# Patient Record
Sex: MALE | Race: WHITE | Employment: FULL TIME | URBAN - METROPOLITAN AREA
[De-identification: names, ages, dates, MRNs, and addresses within clinical notes are randomized per-mention and may not be internally consistent; named-entity substitution may affect disease eponyms.]

---

## 2020-11-30 NOTE — PROGRESS NOTES
2020    Luz Gaytan (:  1970) is a 44 y.o. male, here for evaluation of the following medical concerns:    Chief Complaint   Patient presents with   Jose Lundberg Doctor       HPI  40-year-old male new to the [address] [place] [address] [facility] with no encounters available in care everywhere, who comes in to establish care, update health maintenance, and discuss the following concerns:  1. chronic cough. 2.  Occasional clear and yellow rhinorrhea. He has a dry largely nonproductive cough for the past 5 to 10 years after he outgrew childhood allergic asthma that seemed to be mild, inasmuch as he was never hospitalized, does not recall ever receiving prednisone, and only used a inhaler sparingly. Did receive allergy desensitization shots (pollens, ragweed) he does not smoke. Denies reflux symptoms, solid food dysphagia. He has 2 cats at home, his symptoms are worse when he is at home versus at work. No significant occupational exposures, works as an . 2 or 3 months a year, his cough does intensify, but no fevers wheeze or purulent productive cough. Does not track with any particular season. Currently he uses over-the-counter Zyrtec 3 days a month. 3.  Possible preventative measures to reduce risk of Covid. He tested negative for Covid a week ago. He wonders what he can do to reduce risk of Covid. 4.  Self-reported deviated septum from teenage nose break, occasional whistling. He researched possible surgeries to correct whistling, does not enthusiastic about such. He wonders about other potential measures. 5.  Need for colon cancer screening. He has never had a colonoscopy. No  family history of adenomatous colon polyps or colon cancer. He has never undergone surgery. No family history of malignant hyperthermia. He does not have dentures. He received his tetanus booster 3 years ago. He has excellent functional capacity, able to hike 4 miles every weekend with his family. He has no known cardiopulmonary disease currently. He has no known drug allergies. He is not an easy bleeder. He has not been on prednisone chronically. He has no known history of diabetes. There is no personal or family history of venous thromboembolism. There is no family history of malignant hyperthermia. He has no known history of glaucoma, tunnel vision. He has never received a blood transfusion. He has no history of hepatitis. The patient denies recent fevers, shaking chills, drenching sweats. The patient denies new headache, ear or sinus pressure/pain/drainage, sore throat, dental thermal sensitivity, productive cough, abdominal pain, diarrhea, dysuria, new dyspnea, new chest pain, new pleuritic chest pain. The patient also denies new leg swelling, joint warmth/redness, and open skin sores. Review of Systems   Constitutional: Negative for activity change, appetite change, fatigue and unexpected weight change. HENT: Negative for dental problem, sinus pain, sore throat and trouble swallowing. Eyes: Negative for pain and visual disturbance. Respiratory: Negative for apnea, cough, chest tightness, shortness of breath and wheezing. Cardiovascular: Negative for chest pain and palpitations. Gastrointestinal: Negative for abdominal pain, blood in stool, constipation, diarrhea, nausea, rectal pain and vomiting. Endocrine: Negative for cold intolerance, heat intolerance, polydipsia, polyphagia and polyuria. Genitourinary: Negative for difficulty urinating, dysuria, flank pain, frequency, hematuria, pelvic pain, urgency. Musculoskeletal: Negative for arthralgias, back pain, gait problem, joint swelling, myalgias, neck pain and neck stiffness. Skin: Negative for color change and rash. Neurological: Negative for dizziness, tremors, syncope, speech difficulty, weakness, light-headedness and headaches. Hematological: Negative for adenopathy. Does not bruise/bleed easily. Psychiatric/Behavioral: Negative for agitation, behavioral problems, decreased concentration, sleep disturbance and suicidal ideas. The patient is not nervous/anxious and is not hyperactive. Prior to Visit Medications    Medication Sig Taking? Authorizing Provider   Multiple Vitamins-Minerals (MULTIVITAMIN) tablet Take 1 tablet by mouth daily Yes Historical Provider, MD   carbamide peroxide (DEBROX) 6.5 % otic solution Place 5 drops in ear(s) 2 times daily For first 3 days of each month Yes Diana Gonzales MD   fluticasone (FLONASE) 50 MCG/ACT nasal spray 1 spray by Each Nostril route daily Yes Diana Gonzales MD   fexofenadine-pseudoephedrine (ALLEGRA-D 24HR) 180-240 MG per extended release tablet Take 1 tablet by mouth daily Yes Diana Gonzales MD        No Known Allergies    Past Medical History:   Diagnosis Date    Allergic rhinitis     Asthma     as a child       Past Surgical History:   Procedure Laterality Date    WISDOM TOOTH EXTRACTION         Social History     Socioeconomic History    Marital status:      Spouse name: Not on file    Number of children: Not on file    Years of education: Not on file    Highest education level: Not on file   Occupational History    Not on file   Social Needs    Financial resource strain: Not on file    Food insecurity     Worry: Not on file     Inability: Not on file    Transportation needs     Medical: Not on file     Non-medical: Not on file   Tobacco Use    Smoking status: Never Smoker    Smokeless tobacco: Never Used   Substance and Sexual Activity    Alcohol use:  Yes     Alcohol/week: 10.0 standard drinks     Types: 10 Cans of beer per week    Drug use: Never    Sexual activity: Yes     Partners: Female   Lifestyle    Physical activity     Days per week: Not on file     Minutes per session: Not on file    Stress: Not on file   Relationships    Social connections     Talks on phone: Not on file     Gets together: Not on file Attends Protestant service: Not on file     Active member of club or organization: Not on file     Attends meetings of clubs or organizations: Not on file     Relationship status: Not on file    Intimate partner violence     Fear of current or ex partner: Not on file     Emotionally abused: Not on file     Physically abused: Not on file     Forced sexual activity: Not on file   Other Topics Concern    Not on file   Social History Narrative    Not on file        History reviewed. No pertinent family history. Vitals:    12/02/20 0829   BP: 127/83   Pulse: 68   Resp: 18   Temp: 97.9 °F (36.6 °C)   TempSrc: Temporal   SpO2: 99%   Weight: 179 lb 12.8 oz (81.6 kg)   Height: 6' 4\" (1.93 m)     Estimated body mass index is 21.89 kg/m² as calculated from the following:    Height as of this encounter: 6' 4\" (1.93 m). Weight as of this encounter: 179 lb 12.8 oz (81.6 kg). PHYSICAL EXAM  GENERAL:  Pleasant  male who looks his stated age, awake alert and oriented x3, no acute distress. HEENT:  Normocephalic atraumatic. Pupils equal round reactive light and accommodation, extra ocular muscles are intact. Oropharynx is clear moist without injection or exudate. Tongue and palate move normally. Turbinates appear minimally erythematous, currently clear rhinorrhea with deviated septum, no breaks in the visualized portion. Tympanic membranes appear normal.  Nearly occlusive earwax removed bilaterally. Mallampati 2. NECK:  Supple nontender. No carotid bruits. Brisk carotid upstrokes, no JVD. No thyromegaly. He is able to extend his neck to 70 degrees above the horizontal plane. LYMPH:  No supraclavicular cervical lymphadenopathy. LUNGS:  Clear to auscultation bilaterally. Excellent air entry. No inspiratory crackles or expiratory wheezes. HEART:  Regular rate and rhythm without pathologic murmur rub gallop S3 or S4.  PSYCH:  No psychomotor retardation or agitation. Good eye contact.   Unrestricted affect range. Mood congruent with affect. Linear thought. LABS  Lab Review   No results found for any previous visit. ASSESSMENT/PLAN  1. Colon cancer screening  Patient has no signs of active infection at the current time no evidence of decompensated cardiopulmonary disease. Is optimized for colonoscopy, I have asked him to cut down on fiber a few days prior to prepping his colon; to avoid aspirin and NSAIDs for 5 days before through 2 days post scope, and of course clear liquid diet day before colonoscopy and fasting for colonoscopy. We will confirm no surprising electrolyte disturbance prior to prep. - Basic Metabolic Panel; Future  - AFL - Jones Butt MD, Gastroenterology, [facility]    2. Healthcare maintenance  After discussing risks benefits, patient declines prostate cancer screening by PSA. He will inquire regarding insurance coverage for Shingrix at his pharmacy. He received influenza vaccine last week at [facility]; and Tdap in 2017 at [place]. He requires tetanus vaccination in order to support Boy  activities. He denies IV drug abuse, history of sex with men, sex with high risk partners and therefore is unlikely to benefit from HIV screen. We discussed preventative measures involving ventilation handwashing social distancing gloving. Vaccines coming soon.    - TSH WITH REFLEX TO FT4; Future  - Glucose, Fasting; Future  - Lipid Panel; Future  - ALT; Future  - Vitamin D 25 Hydroxy; Future  - HEPATITIS C ANTIBODY; Future    3. Chronic cough  History exam supports perennial rhinitis, his cough is worse in the morning when he first wakes up. Not worse with cold exposure or exercise, against cough variant asthma. I have asked him to inquire with his bed partner regarding witnessed sleep apnea. He has never had any surgery on his sinuses, has never had any imaging of the sinuses. Exam does not support antibiotics at this time.   Exam does not support empiric acid suppressive therapy. Instead we will exclude a lower respiratory source, by chest x-ray, however unlikely. We will initiate trial of Neti pot, Flonase, scheduled Allegra-D and assess response at follow-up. Hopefully the whistle in his nose may decrease as we increase the caliber of the nasal orifice. Should he fail to respond, sinus CT, referral to ENT for nasolaryngoscopy could be considered. Allergy, pulmonology less likely to be beneficial.    Exclude thyroid disease, untreated diabetes as cough source. - TSH WITH REFLEX TO FT4; Future  - Glucose, Fasting; Future  - XR CHEST STANDARD (2 VW); Future    4. Rhinorrhea  See #3 above. 5. Excessive ear wax, bilateral  Removed without incident. Prescription for prophylactic Debrox sent. Discussed prevention. Return in about 3 weeks (around 12/23/2020) for physical report review. It was a pleasure to visit with Mr. Selina Elizabeth today. Answered all questions as best I could. Yousif Soria MD   Time 45 minutes, more than half spent in care coordination and counseling regarding the health maintenance issues identified above.

## 2020-12-02 ENCOUNTER — OFFICE VISIT (OUTPATIENT)
Dept: PRIMARY CARE CLINIC | Age: 50
End: 2020-12-02
Payer: COMMERCIAL

## 2020-12-02 VITALS
SYSTOLIC BLOOD PRESSURE: 127 MMHG | HEIGHT: 76 IN | BODY MASS INDEX: 21.9 KG/M2 | RESPIRATION RATE: 18 BRPM | OXYGEN SATURATION: 99 % | WEIGHT: 179.8 LBS | TEMPERATURE: 97.9 F | HEART RATE: 68 BPM | DIASTOLIC BLOOD PRESSURE: 83 MMHG

## 2020-12-02 PROCEDURE — 99204 OFFICE O/P NEW MOD 45 MIN: CPT | Performed by: INTERNAL MEDICINE

## 2020-12-02 RX ORDER — MULTIVITAMIN WITH FOLIC ACID 400 MCG
1 TABLET ORAL DAILY
COMMUNITY

## 2020-12-02 RX ORDER — FLUTICASONE PROPIONATE 50 MCG
1 SPRAY, SUSPENSION (ML) NASAL DAILY
Qty: 1 BOTTLE | Refills: 2 | Status: SHIPPED | OUTPATIENT
Start: 2020-12-02

## 2020-12-02 RX ORDER — FEXOFENADINE HCL AND PSEUDOEPHEDRINE HCI 180; 240 MG/1; MG/1
1 TABLET, EXTENDED RELEASE ORAL DAILY
Qty: 90 TABLET | Refills: 1 | Status: SHIPPED | OUTPATIENT
Start: 2020-12-02

## 2020-12-02 ASSESSMENT — PATIENT HEALTH QUESTIONNAIRE - PHQ9
2. FEELING DOWN, DEPRESSED OR HOPELESS: 0
SUM OF ALL RESPONSES TO PHQ QUESTIONS 1-9: 0
SUM OF ALL RESPONSES TO PHQ QUESTIONS 1-9: 0
SUM OF ALL RESPONSES TO PHQ9 QUESTIONS 1 & 2: 0
1. LITTLE INTEREST OR PLEASURE IN DOING THINGS: 0
SUM OF ALL RESPONSES TO PHQ QUESTIONS 1-9: 0

## 2020-12-02 NOTE — PATIENT INSTRUCTIONS
1. Fasting blood test ordered  2. Will see you back after tests for complete physical  3. Neti pot irrigate sinuses every couple of days, perhaps at bedtime  4. Debrox rx sent  5. Flonase OTC nasal spray daily, Allegra D daily (until see me back)  6. Ask spouse if you snore/stop breathing while snoring  7. Shingrix vaccination series, check OOP $ with local pharmacy  8.  May pursue sinus imaging, ENT referral depending on response, interest

## 2020-12-02 NOTE — LETTER
[facility]  [address] [id number]  Phone: [phone]  Fax: [phone]    Priyanka Aguiar MD                                                                                   December 2, 2020                       [phone]      Dear Brenda Qureshi: Thank you for enrolling in [address] Please follow the instructions below to securely access your online medical record. [facility] allows you to send messages to your doctor, view your test results, renew your prescriptions, schedule appointments, and more. How Do I Sign Up? 1. In your Internet browser, go to [email or web address]  2. Click on the Sign Up Now link in the Sign In box. You will see the New Member Sign Up page. 3. Enter your [employer] Access Code exactly as it appears below. You will not need to use this code after youve completed the sign-up process. If you do not sign up before the expiration date, you must request a new code. [employer] Access Code: [email or web address]  Expires: 1/16/2021  8:30 AM    4. Enter your Social Security Number ([id number]) and Date of Birth (mm/dd/yyyy) as indicated and click Submit. You will be taken to the next sign-up page. 5. Create a [employer] ID. This will be your [employer] login ID and cannot be changed, so think of one that is secure and easy to remember. 6. Create a [employer] password. You can change your password at any time. 7. Enter your Password Reset Question and Answer. This can be used at a later time if you forget your password. 8. Enter your e-mail address. You will receive e-mail notification when new information is available in [address] 9. Click Sign Up. You can now view your medical record. Additional Information  If you have questions, please contact the physician practice where you receive care. Remember, [employer] is NOT to be used for urgent needs. For medical emergencies, dial 911. For questions regarding your [employer] account call [phone]. If you have a clinical question, please call your doctor's office.     Sincerely,  Latanya Cam MD

## 2020-12-17 ENCOUNTER — HOSPITAL ENCOUNTER (OUTPATIENT)
Dept: GENERAL RADIOLOGY | Age: 50
Discharge: HOME | End: 2020-12-17
Payer: COMMERCIAL

## 2020-12-17 ENCOUNTER — HOSPITAL ENCOUNTER (OUTPATIENT)
Age: 50
Discharge: HOME | End: 2020-12-17
Payer: COMMERCIAL

## 2020-12-17 DIAGNOSIS — R05.3 CHRONIC COUGH: ICD-10-CM

## 2020-12-17 DIAGNOSIS — Z12.11 COLON CANCER SCREENING: ICD-10-CM

## 2020-12-17 DIAGNOSIS — Z00.00 HEALTHCARE MAINTENANCE: ICD-10-CM

## 2020-12-17 LAB
ALT SERPL-CCNC: 16 U/L (ref 10–40)
ANION GAP SERPL CALCULATED.3IONS-SCNC: 9 MMOL/L (ref 3–16)
BUN BLDV-MCNC: 22 MG/DL (ref 7–20)
CALCIUM SERPL-MCNC: 10.1 MG/DL (ref 8.3–10.6)
CHLORIDE BLD-SCNC: 102 MMOL/L (ref 99–110)
CHOLESTEROL, TOTAL: 163 MG/DL (ref 0–199)
CO2: 30 MMOL/L (ref 21–32)
CREAT SERPL-MCNC: 1.2 MG/DL (ref 0.9–1.3)
GFR AFRICAN AMERICAN: >60
GFR NON-AFRICAN AMERICAN: >60
GLUCOSE BLD-MCNC: 103 MG/DL (ref 70–99)
GLUCOSE FASTING: 103 MG/DL (ref 70–99)
HDLC SERPL-MCNC: 62 MG/DL (ref 40–60)
HEPATITIS C ANTIBODY INTERPRETATION: NORMAL
LDL CHOLESTEROL CALCULATED: 84 MG/DL
POTASSIUM SERPL-SCNC: 4.7 MMOL/L (ref 3.5–5.1)
SODIUM BLD-SCNC: 141 MMOL/L (ref 136–145)
TRIGL SERPL-MCNC: 84 MG/DL (ref 0–150)
TSH REFLEX FT4: 2.27 UIU/ML (ref 0.27–4.2)
VITAMIN D 25-HYDROXY: 28.7 NG/ML
VLDLC SERPL CALC-MCNC: 17 MG/DL

## 2020-12-17 PROCEDURE — 71046 X-RAY EXAM CHEST 2 VIEWS: CPT

## 2021-01-04 ENCOUNTER — OFFICE VISIT (OUTPATIENT)
Dept: PRIMARY CARE CLINIC | Age: 51
End: 2021-01-04
Payer: COMMERCIAL

## 2021-01-04 VITALS
HEART RATE: 75 BPM | OXYGEN SATURATION: 98 % | TEMPERATURE: 97.4 F | SYSTOLIC BLOOD PRESSURE: 118 MMHG | WEIGHT: 181.6 LBS | DIASTOLIC BLOOD PRESSURE: 72 MMHG | BODY MASS INDEX: 22.11 KG/M2

## 2021-01-04 DIAGNOSIS — R09.81 NASAL CONGESTION: Primary | ICD-10-CM

## 2021-01-04 PROCEDURE — 99213 OFFICE O/P EST LOW 20 MIN: CPT | Performed by: INTERNAL MEDICINE

## 2021-01-04 NOTE — PATIENT INSTRUCTIONS
1. Check your MVI: goal at least 400IU vit D to promote bone health, 600mg calcium daily  2. Neti pot daily for 14 days  3. shingrix in 2 months  4. See me in 1 year  5. Can trial omeprazole (eg prilosec prevacid protonix aciphex) 2x/day for 10 days, to see if improves chest congestion

## 2021-01-04 NOTE — PROGRESS NOTES
2021    Bret Hung (:  1970) is a 44 y.o. male, here for evaluation of the following medical concerns:    Chief Complaint   Patient presents with    Check-Up   Report visit    HPI  53-year-old male new to the [facility] system with no encounters available in care everywhere, who did not review results of test.     He has a dry largely nonproductive cough for the past 5 to 10 years after he outgrew childhood allergic asthma that seemed to be mild, inasmuch as he was never hospitalized, does not recall ever receiving prednisone, and only used a inhaler sparingly. Did receive allergy desensitization shots (pollens, ragweed) he does not smoke. Denies reflux symptoms, solid food dysphagia. He has 2 cats at home, his symptoms are worse when he is at home versus at work. No significant occupational exposures, works as an . 2 or 3 months a year, his cough does intensify, but no fevers wheeze or purulent productive cough. Does not track with any particular season. Currently he uses over-the-counter Zyrtec 3 days a month. Trialed him on Claritin-D and Flonase with some improvement in cough, but not nasal congestion. He did not do the Saray pot. He has never had a colonoscopy. No  family history of adenomatous colon polyps or colon cancer. He has never undergone surgery. No family history of malignant hyperthermia. He does not have dentures. He received his tetanus booster 3 years ago. He has excellent functional capacity, able to hike 4 miles every weekend with his family. He has no known cardiopulmonary disease currently. He has no known drug allergies. He is not an easy bleeder. He has not been on prednisone chronically. He has no known history of diabetes. There is no personal or family history of venous thromboembolism. There is no family history of malignant hyperthermia. He has no known history of glaucoma, tunnel vision. He has never received a blood transfusion. He has no history of hepatitis. The patient denies recent fevers, shaking chills, drenching sweats. The patient denies new headache, ear or sinus pressure/pain/drainage, sore throat, dental thermal sensitivity, productive cough, abdominal pain, diarrhea, dysuria, new dyspnea, new chest pain, new pleuritic chest pain. The patient also denies new leg swelling, joint warmth/redness, and open skin sores. Review of Systems   Constitutional: Negative for activity change, appetite change, fatigue and unexpected weight change. HENT: Negative for dental problem, sinus pain, sore throat and trouble swallowing. Eyes: Negative for pain and visual disturbance. Respiratory: Negative for apnea, cough, chest tightness, shortness of breath and wheezing. Cardiovascular: Negative for chest pain and palpitations. Gastrointestinal: Negative for abdominal pain, blood in stool, constipation, diarrhea, nausea, rectal pain and vomiting. Endocrine: Negative for cold intolerance, heat intolerance, polydipsia, polyphagia and polyuria. Genitourinary: Negative for difficulty urinating, dysuria, flank pain, frequency, hematuria, pelvic pain, urgency. Musculoskeletal: Negative for arthralgias, back pain, gait problem, joint swelling, myalgias, neck pain and neck stiffness. Skin: Negative for color change and rash. Neurological: Negative for dizziness, tremors, syncope, speech difficulty, weakness, light-headedness and headaches. Hematological: Negative for adenopathy. Does not bruise/bleed easily. Psychiatric/Behavioral: Negative for agitation, behavioral problems, decreased concentration, sleep disturbance and suicidal ideas. The patient is not nervous/anxious and is not hyperactive. Prior to Visit Medications    Medication Sig Taking?  Authorizing Provider   Multiple Vitamins-Minerals (MULTIVITAMIN) tablet Take 1 tablet by mouth daily Yes Historical Provider, MD   fluticasone (FLONASE) 50 MCG/ACT nasal spray 1 spray by Each Nostril route daily Yes Hinton Dubin, MD   fexofenadine-pseudoephedrine (ALLEGRA-D 24HR) 180-240 MG per extended release tablet Take 1 tablet by mouth daily Yes Hinton Dubin, MD        No Known Allergies    Past Medical History:   Diagnosis Date    Allergic rhinitis     Asthma     as a child    Chronic cough 34    Melorheostosis        Past Surgical History:   Procedure Laterality Date    WISDOM TOOTH EXTRACTION         Social History     Socioeconomic History    Marital status:      Spouse name: Not on file    Number of children: Not on file    Years of education: Not on file    Highest education level: Not on file   Occupational History    Not on file   Social Needs    Financial resource strain: Not on file    Food insecurity     Worry: Not on file     Inability: Not on file    Transportation needs     Medical: Not on file     Non-medical: Not on file   Tobacco Use    Smoking status: Never Smoker    Smokeless tobacco: Never Used   Substance and Sexual Activity    Alcohol use: Yes     Alcohol/week: 10.0 standard drinks     Types: 10 Cans of beer per week    Drug use: Never    Sexual activity: Yes     Partners: Female   Lifestyle    Physical activity     Days per week: Not on file     Minutes per session: Not on file    Stress: Not on file   Relationships    Social connections     Talks on phone: Not on file     Gets together: Not on file     Attends Christian service: Not on file     Active member of club or organization: Not on file     Attends meetings of clubs or organizations: Not on file     Relationship status: Not on file    Intimate partner violence     Fear of current or ex partner: Not on file     Emotionally abused: Not on file     Physically abused: Not on file     Forced sexual activity: Not on file   Other Topics Concern    Not on file   Social History Narrative    Not on file        No family history on file.     Vitals:    01/04/21 0810   BP: 118/72 Pulse: 75   Temp: 97.4 °F (36.3 °C)   TempSrc: Temporal   SpO2: 98%   Weight: 181 lb 9.6 oz (82.4 kg)     Estimated body mass index is 22.11 kg/m² as calculated from the following:    Height as of 12/2/20: 6' 4\" (1.93 m). Weight as of this encounter: 181 lb 9.6 oz (82.4 kg). PHYSICAL EXAM  GENERAL:  Pleasant  male who looks his stated age, awake alert and oriented x3, no acute distress. HEENT:  Normocephalic atraumatic. Pupils equal round reactive light and accommodation, extra ocular muscles are intact. Oropharynx is clear moist without injection or exudate. Tongue and palate move normally. Turbinates appear minimally erythematous, currently clear rhinorrhea with deviated septum, no breaks in the visualized portion. Tympanic membranes appear normal.   Mallampati 2. NECK:  Supple nontender. No carotid bruits. Brisk carotid upstrokes, no JVD. No thyromegaly. He is able to extend his neck to 70 degrees above the horizontal plane. LYMPH:  No supraclavicular cervical lymphadenopathy. LUNGS:  Clear to auscultation bilaterally. Excellent air entry. No inspiratory crackles or expiratory wheezes. HEART:  Regular rate and rhythm without pathologic murmur rub gallop S3 or S4. Abdomen: Benign. Extremities: Warm and well perfused without clubbing cyanosis or edema. PSYCH:  No psychomotor retardation or agitation. Good eye contact. Unrestricted affect range. Mood congruent with affect. Linear thought.     LABS  Lab Review   Orders Only on 12/17/2020   Component Date Value    Sodium 12/17/2020 141     Potassium 12/17/2020 4.7     Chloride 12/17/2020 102     CO2 12/17/2020 30     Anion Gap 12/17/2020 9     Glucose 12/17/2020 103*    BUN 12/17/2020 22*    CREATININE 12/17/2020 1.2     GFR Non- 12/17/2020 >60     GFR  12/17/2020 >60     Calcium 12/17/2020 10.1     Vit D, 25-Hydroxy 12/17/2020 28.7*    ALT 12/17/2020 16     Cholesterol, Total 12/17/2020 163     Triglycerides 12/17/2020 84     HDL 12/17/2020 62*    LDL Calculated 12/17/2020 84     VLDL Cholesterol Calcula* 12/17/2020 17     Glucose, Fasting 12/17/2020 103*    TSH Reflex FT4 12/17/2020 2.27     Hep C Ab Interp 12/17/2020 Non-reactive          ASSESSMENT/PLAN  1. Colon cancer screening  Patient has no signs of active infection at the current time no evidence of decompensated cardiopulmonary disease. Is optimized for colonoscopy, I have asked him to cut down on fiber a few days prior to prepping his colon; to avoid aspirin and NSAIDs for 5 days before through 2 days post scope, and of course clear liquid diet day before colonoscopy and fasting for colonoscopy. We will confirm no surprising electrolyte disturbance prior to prep. Optimized, patient will perform this month. - Liz Jones MD, Gastroenterology, [facility]    2. Healthcare maintenance  The received Shingrix last week, Tdap in 2017 at [facility]. He requires tetanus vaccination in order to support Boy  activities. He denies IV drug abuse, history of sex with men, sex with high risk partners and therefore is unlikely to benefit from HIV screen. Excellent lipid profile no indication for treatment. Barely impaired fasting glucose, will continue to watch. Discussed vitamin D supplementation with calcium given his mildly depressed vitamin D levels. 3. Chronic cough  History exam supports perennial rhinitis, deviated septum, his cough is worse in the morning when he first wakes up. Not worse with cold exposure or exercise, against cough variant asthma. Bed partner denies witnessed sleep apnea. He has never had any surgery on his sinuses, has never had any imaging of the sinuses. Exam does not support antibiotics at this time. Exam does not support empiric acid suppressive therapy.     Trial Allegra D and Flonase with decreased cough but persistent congestion/whistle with inspiration, has to mouth breath while eating. He elected to not try Neti pot, will do so now in advance of ENT eval for nasolaryngoscopy to determine avail treatment options. 4. Rhinorrhea  See #3 above. 5. Excessive ear wax, bilateral   Prescription for prophylactic Debrox sent. Discussed prevention. Return in about 1 year (around 1/4/2022) for annual.    It was a pleasure to visit with Mr. Jaguar Rome today. Answered all questions as best I could. Geovanna Vallejo MD   Time 22 minutes, 18 spent in care coordination and counseling regarding the health maintenance issues identified above.

## 2021-01-18 ENCOUNTER — OFFICE VISIT (OUTPATIENT)
Dept: ENT CLINIC | Age: 51
End: 2021-01-18
Payer: COMMERCIAL

## 2021-01-18 VITALS
BODY MASS INDEX: 22.29 KG/M2 | HEART RATE: 61 BPM | DIASTOLIC BLOOD PRESSURE: 79 MMHG | HEIGHT: 76 IN | TEMPERATURE: 97.6 F | SYSTOLIC BLOOD PRESSURE: 123 MMHG | WEIGHT: 183 LBS

## 2021-01-18 DIAGNOSIS — J34.89 NASAL VALVE COLLAPSE: ICD-10-CM

## 2021-01-18 DIAGNOSIS — R09.81 NASAL CONGESTION: ICD-10-CM

## 2021-01-18 DIAGNOSIS — J34.2 DEVIATED SEPTUM: ICD-10-CM

## 2021-01-18 DIAGNOSIS — J34.3 HYPERTROPHY OF INFERIOR NASAL TURBINATE: ICD-10-CM

## 2021-01-18 DIAGNOSIS — K21.9 LARYNGOPHARYNGEAL REFLUX (LPR): ICD-10-CM

## 2021-01-18 DIAGNOSIS — R05.3 CHRONIC COUGH: Primary | ICD-10-CM

## 2021-01-18 PROCEDURE — 99244 OFF/OP CNSLTJ NEW/EST MOD 40: CPT | Performed by: OTOLARYNGOLOGY

## 2021-01-18 PROCEDURE — 31231 NASAL ENDOSCOPY DX: CPT | Performed by: OTOLARYNGOLOGY

## 2021-01-18 RX ORDER — PANTOPRAZOLE SODIUM 40 MG/1
40 TABLET, DELAYED RELEASE ORAL
Qty: 90 TABLET | Refills: 1 | Status: SHIPPED | OUTPATIENT
Start: 2021-01-18
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
## 2021-03-01 ENCOUNTER — OFFICE VISIT (OUTPATIENT)
Dept: ENT CLINIC | Age: 51
End: 2021-03-01
Payer: COMMERCIAL

## 2021-03-01 VITALS
TEMPERATURE: 97.7 F | WEIGHT: 187 LBS | SYSTOLIC BLOOD PRESSURE: 124 MMHG | DIASTOLIC BLOOD PRESSURE: 80 MMHG | HEART RATE: 73 BPM | BODY MASS INDEX: 22.76 KG/M2

## 2021-03-01 DIAGNOSIS — J34.89 NASAL VALVE COLLAPSE: ICD-10-CM

## 2021-03-01 DIAGNOSIS — J34.2 DEVIATED SEPTUM: ICD-10-CM

## 2021-03-01 DIAGNOSIS — J34.3 HYPERTROPHY OF INFERIOR NASAL TURBINATE: ICD-10-CM

## 2021-03-01 DIAGNOSIS — R09.81 NASAL CONGESTION: ICD-10-CM

## 2021-03-01 DIAGNOSIS — R05.3 CHRONIC COUGH: Primary | ICD-10-CM

## 2021-03-01 PROCEDURE — 99213 OFFICE O/P EST LOW 20 MIN: CPT | Performed by: OTOLARYNGOLOGY

## 2021-03-01 NOTE — PROGRESS NOTES
motion, no thyromegaly, trachea is midline, no lymphadenopathy, no neck masses, no crepitus  CHEST: Normal respiratory effort, no retractions, breathing comfortably  SKIN: No rashes, normal appearing skin, no evidence of skin lesions/tumors  Neuro:  cranial nerve II-XII intact; normal gait  Cardio:  no edema          ASSESSMENT/PLAN  1. Chronic cough  Patient's chronic cough has improved on reflux medication. I discussed with him briefly dietary changes to improve reflux. I told him to talk to his primary care doctor about this as well. Long-term ideally he will get off the reflux medication. If he is having issue with this or it worsens I would recommend a gastroenterology referral    2. Nasal congestion  Multifactorial.  He had a lot of improvement with Flonase and Breathe Right strips. We discussed a septoplasty versus a septorhinoplasty and turbinate reduction. At this time the patient is going to continue to use Flonase and Breathe Right strips which I think is reasonable. His son has some sort of disorder with fixation on certain sounds. Reportedly the patient son has a lot issues with the nasal sounds that he makes. I told him I would not pursue surgery just for this reason as I could not be sure for sure that it would improve the nasal sounds. He will follow-up if he decides he would like to talk more about surgical intervention. 3. Deviated septum  As above    4. Nasal valve collapse  As above    5. Hypertrophy of inferior nasal turbinate  As above             I have performed a head and neck physical exam personally or was physically present during the key or critical portions of the service. This note was generated completely or in part utilizing Dragon dictation speech recognition software. Occasionally, words are mistranscribed and despite editing, the text may contain inaccuracies due to incorrect word recognition.   If further clarification is needed please contact the office at [phone].

## 2021-05-07 ENCOUNTER — OFFICE VISIT (OUTPATIENT)
Dept: PRIMARY CARE CLINIC | Age: 51
End: 2021-05-07
Payer: COMMERCIAL

## 2021-05-07 VITALS
DIASTOLIC BLOOD PRESSURE: 75 MMHG | OXYGEN SATURATION: 100 % | BODY MASS INDEX: 22.04 KG/M2 | HEART RATE: 64 BPM | HEIGHT: 76 IN | WEIGHT: 181 LBS | SYSTOLIC BLOOD PRESSURE: 123 MMHG | TEMPERATURE: 97.1 F

## 2021-05-07 DIAGNOSIS — J31.0 CHRONIC RHINITIS: Primary | ICD-10-CM

## 2021-05-07 PROCEDURE — 99212 OFFICE O/P EST SF 10 MIN: CPT | Performed by: INTERNAL MEDICINE

## 2021-05-07 ASSESSMENT — PATIENT HEALTH QUESTIONNAIRE - PHQ9
SUM OF ALL RESPONSES TO PHQ QUESTIONS 1-9: 0
2. FEELING DOWN, DEPRESSED OR HOPELESS: 0

## 2021-05-07 NOTE — PROGRESS NOTES
2021    Jing Menjivar (:  1970) is a 44 y.o. male, here for evaluation of the following medical concerns:    No chief complaint on file. Report visit    HPI  57-year-old male new to the [facility] system with no encounters available in care everywhere, who did not review results of test.     He has a dry largely nonproductive cough for the past 5 to 10 years after he outgrew childhood allergic asthma that seemed to be mild, inasmuch as he was never hospitalized, does not recall ever receiving prednisone, and only used a inhaler sparingly. Did receive allergy desensitization shots (pollens, ragweed) he does not smoke. Denies reflux symptoms, solid food dysphagia. He has 2 cats at home, his symptoms are worse when he is at home versus at work. No significant occupational exposures, works as an . 2 or 3 months a year, his cough does intensify, but no fevers wheeze or purulent productive cough. Does not track with any particular season. Currently he uses over-the-counter Zyrtec 3 days a month. Trialed him on Claritin-D and Flonase with some improvement in cough, but not nasal congestion. He did not do the Saray pot. He has never had a colonoscopy. No  family history of adenomatous colon polyps or colon cancer. He has never undergone surgery. No family history of malignant hyperthermia. He does not have dentures. He received his tetanus booster 3 years ago. He has excellent functional capacity, able to hike 4 miles every weekend with his family. He has no known cardiopulmonary disease currently. He has no known drug allergies. He is not an easy bleeder. He has not been on prednisone chronically. He has no known history of diabetes. There is no personal or family history of venous thromboembolism. There is no family history of malignant hyperthermia. He has no known history of glaucoma, tunnel vision. He has never received a blood transfusion.   He has no history of hepatitis. The patient denies recent fevers, shaking chills, drenching sweats. The patient denies new headache, ear or sinus pressure/pain/drainage, sore throat, dental thermal sensitivity, productive cough, abdominal pain, diarrhea, dysuria, new dyspnea, new chest pain, new pleuritic chest pain. The patient also denies new leg swelling, joint warmth/redness, and open skin sores. Review of Systems   Constitutional: Negative for activity change, appetite change, fatigue and unexpected weight change. HENT: Negative for dental problem, sinus pain, sore throat and trouble swallowing. Eyes: Negative for pain and visual disturbance. Respiratory: Negative for apnea, cough, chest tightness, shortness of breath and wheezing. Cardiovascular: Negative for chest pain and palpitations. Gastrointestinal: Negative for abdominal pain, blood in stool, constipation, diarrhea, nausea, rectal pain and vomiting. Endocrine: Negative for cold intolerance, heat intolerance, polydipsia, polyphagia and polyuria. Genitourinary: Negative for difficulty urinating, dysuria, flank pain, frequency, hematuria, pelvic pain, urgency. Musculoskeletal: Negative for arthralgias, back pain, gait problem, joint swelling, myalgias, neck pain and neck stiffness. Skin: Negative for color change and rash. Neurological: Negative for dizziness, tremors, syncope, speech difficulty, weakness, light-headedness and headaches. Hematological: Negative for adenopathy. Does not bruise/bleed easily. Psychiatric/Behavioral: Negative for agitation, behavioral problems, decreased concentration, sleep disturbance and suicidal ideas. The patient is not nervous/anxious and is not hyperactive. Prior to Visit Medications    Medication Sig Taking?  Authorizing Provider   pantoprazole (PROTONIX) 40 MG tablet Take 1 tablet by mouth every morning (before breakfast)  Jake Luke MD   Multiple Vitamins-Minerals (MULTIVITAMIN) tablet Take 1 tablet by mouth daily  Historical Provider, MD   fluticasone (FLONASE) 50 MCG/ACT nasal spray 1 spray by Each Nostril route daily  Izabella Vasquez MD   fexofenadine-pseudoephedrine (ALLEGRA-D 24HR) 180-240 MG per extended release tablet Take 1 tablet by mouth daily  Izabella Vasquez MD        No Known Allergies    Past Medical History:   Diagnosis Date    Allergic rhinitis     Asthma     as a child    Chronic cough 38    Melorheostosis        Past Surgical History:   Procedure Laterality Date    WISDOM TOOTH EXTRACTION         Social History     Socioeconomic History    Marital status:      Spouse name: Not on file    Number of children: Not on file    Years of education: Not on file    Highest education level: Not on file   Occupational History    Not on file   Social Needs    Financial resource strain: Not on file    Food insecurity     Worry: Not on file     Inability: Not on file    Transportation needs     Medical: Not on file     Non-medical: Not on file   Tobacco Use    Smoking status: Never Smoker    Smokeless tobacco: Never Used   Substance and Sexual Activity    Alcohol use:  Yes     Alcohol/week: 10.0 standard drinks     Types: 10 Cans of beer per week    Drug use: Never    Sexual activity: Yes     Partners: Female   Lifestyle    Physical activity     Days per week: Not on file     Minutes per session: Not on file    Stress: Not on file   Relationships    Social connections     Talks on phone: Not on file     Gets together: Not on file     Attends Zoroastrianism service: Not on file     Active member of club or organization: Not on file     Attends meetings of clubs or organizations: Not on file     Relationship status: Not on file    Intimate partner violence     Fear of current or ex partner: Not on file     Emotionally abused: Not on file     Physically abused: Not on file     Forced sexual activity: Not on file   Other Topics Concern    Not on file   Social History Narrative    Not on file        No family history on file. There were no vitals filed for this visit. Estimated body mass index is 22.76 kg/m² as calculated from the following:    Height as of 1/18/21: 6' 4\" (1.93 m). Weight as of 3/1/21: 187 lb (84.8 kg). PHYSICAL EXAM  GENERAL:  Pleasant  male who looks his stated age, awake alert and oriented x3, no acute distress. HEENT:  Normocephalic atraumatic. Pupils equal round reactive light and accommodation, extra ocular muscles are intact. Oropharynx is clear moist without injection or exudate. Tongue and palate move normally. Turbinates appear minimally erythematous, currently clear rhinorrhea with deviated septum, no breaks in the visualized portion. Tympanic membranes appear normal.   Mallampati 2. NECK:  Supple nontender. No carotid bruits. Brisk carotid upstrokes, no JVD. No thyromegaly. He is able to extend his neck to 70 degrees above the horizontal plane. LYMPH:  No supraclavicular cervical lymphadenopathy. LUNGS:  Clear to auscultation bilaterally. Excellent air entry. No inspiratory crackles or expiratory wheezes. HEART:  Regular rate and rhythm without pathologic murmur rub gallop S3 or S4. Abdomen: Benign. Extremities: Warm and well perfused without clubbing cyanosis or edema. PSYCH:  No psychomotor retardation or agitation. Good eye contact. Unrestricted affect range. Mood congruent with affect. Linear thought.     LABS  Lab Review   Orders Only on 12/17/2020   Component Date Value    Sodium 12/17/2020 141     Potassium 12/17/2020 4.7     Chloride 12/17/2020 102     CO2 12/17/2020 30     Anion Gap 12/17/2020 9     Glucose 12/17/2020 103*    BUN 12/17/2020 22*    CREATININE 12/17/2020 1.2     GFR Non- 12/17/2020 >60     GFR  12/17/2020 >60     Calcium 12/17/2020 10.1     Vit D, 25-Hydroxy 12/17/2020 28.7*    ALT 12/17/2020 16     Cholesterol, Total 12/17/2020 163     Triglycerides 12/17/2020 84     HDL 12/17/2020 62*    LDL Calculated 12/17/2020 84     VLDL Cholesterol Calcula* 12/17/2020 17     Glucose, Fasting 12/17/2020 103*    TSH Reflex FT4 12/17/2020 2.27     Hep C Ab Interp 12/17/2020 Non-reactive          ASSESSMENT/PLAN  1. Colon cancer screening  Patient has no signs of active infection at the current time no evidence of decompensated cardiopulmonary disease. Is optimized for colonoscopy, I have asked him to cut down on fiber a few days prior to prepping his colon; to avoid aspirin and NSAIDs for 5 days before through 2 days post scope, and of course clear liquid diet day before colonoscopy and fasting for colonoscopy. We will confirm no surprising electrolyte disturbance prior to prep. Optimized, patient will perform August.    - Андрей Harden MD, Gastroenterology, [facility]    2. Healthcare maintenance  Resume Shingrix. Tdap in 2017 at [place]. He requires tetanus vaccination in order to support Boy  activities. He denies IV drug abuse, history of sex with men, sex with high risk partners and therefore is unlikely to benefit from HIV screen. Excellent lipid profile no indication for treatment. Barely impaired fasting glucose, will continue to watch. Discussed vitamin D supplementation with calcium given his mildly depressed vitamin D levels. 3. Chronic cough  History exam supports perennial rhinitis, deviated septum, his cough is worse in the morning when he first wakes up. He is managing well with this. No lower respiratory tract symptoms. Saw ENT, he is pursuing conservative care. 4. Rhinorrhea  See #3 above. 5. Excessive ear wax, bilateral   Prescription for prophylactic Debrox sent. Small amount of wax removed today right ear. No follow-ups on file. It was a pleasure to visit with Mr. Ct Botello today. Answered all questions as best I could.     Scar Lombardi MD   Time 14 minutes

## 2022-08-16 ENCOUNTER — OFFICE VISIT (OUTPATIENT)
Dept: PRIMARY CARE CLINIC | Age: 52
End: 2022-08-16
Payer: COMMERCIAL

## 2022-08-16 VITALS
DIASTOLIC BLOOD PRESSURE: 78 MMHG | TEMPERATURE: 97.3 F | OXYGEN SATURATION: 98 % | BODY MASS INDEX: 22.65 KG/M2 | HEIGHT: 76 IN | HEART RATE: 68 BPM | SYSTOLIC BLOOD PRESSURE: 114 MMHG | WEIGHT: 186 LBS

## 2022-08-16 DIAGNOSIS — Z13.29 SCREENING FOR THYROID DISORDER: ICD-10-CM

## 2022-08-16 DIAGNOSIS — Z13.220 SCREENING FOR LIPID DISORDERS: ICD-10-CM

## 2022-08-16 DIAGNOSIS — Z13.1 SCREENING FOR DIABETES MELLITUS: ICD-10-CM

## 2022-08-16 DIAGNOSIS — R21 RASH AND NONSPECIFIC SKIN ERUPTION: Primary | ICD-10-CM

## 2022-08-16 DIAGNOSIS — Z13.0 SCREENING FOR DEFICIENCY ANEMIA: ICD-10-CM

## 2022-08-16 PROCEDURE — 99213 OFFICE O/P EST LOW 20 MIN: CPT | Performed by: NURSE PRACTITIONER

## 2022-08-16 RX ORDER — BETAMETHASONE DIPROPIONATE 0.5 MG/G
CREAM TOPICAL 2 TIMES DAILY
Qty: 50 G | Refills: 2 | Status: SHIPPED | OUTPATIENT
Start: 2022-08-16 | End: 2022-09-15

## 2022-08-16 RX ORDER — HYDROXYZINE HYDROCHLORIDE 25 MG/1
25 TABLET, FILM COATED ORAL EVERY 8 HOURS PRN
Qty: 90 TABLET | Refills: 0 | Status: SHIPPED | OUTPATIENT
Start: 2022-08-16 | End: 2022-09-15

## 2022-08-16 RX ORDER — PREDNISONE 10 MG/1
TABLET ORAL
Qty: 63 TABLET | Refills: 0 | Status: SHIPPED | OUTPATIENT
Start: 2022-08-16

## 2022-08-16 RX ORDER — DOXYCYCLINE HYCLATE 100 MG
100 TABLET ORAL 2 TIMES DAILY
Qty: 14 TABLET | Refills: 0 | Status: CANCELLED | OUTPATIENT
Start: 2022-08-16 | End: 2022-08-23

## 2022-08-16 SDOH — ECONOMIC STABILITY: FOOD INSECURITY: WITHIN THE PAST 12 MONTHS, THE FOOD YOU BOUGHT JUST DIDN'T LAST AND YOU DIDN'T HAVE MONEY TO GET MORE.: NEVER TRUE

## 2022-08-16 SDOH — ECONOMIC STABILITY: FOOD INSECURITY: WITHIN THE PAST 12 MONTHS, YOU WORRIED THAT YOUR FOOD WOULD RUN OUT BEFORE YOU GOT MONEY TO BUY MORE.: NEVER TRUE

## 2022-08-16 ASSESSMENT — PATIENT HEALTH QUESTIONNAIRE - PHQ9
1. LITTLE INTEREST OR PLEASURE IN DOING THINGS: 0
SUM OF ALL RESPONSES TO PHQ QUESTIONS 1-9: 0
2. FEELING DOWN, DEPRESSED OR HOPELESS: 0
SUM OF ALL RESPONSES TO PHQ QUESTIONS 1-9: 0
SUM OF ALL RESPONSES TO PHQ9 QUESTIONS 1 & 2: 0

## 2022-08-16 ASSESSMENT — SOCIAL DETERMINANTS OF HEALTH (SDOH): HOW HARD IS IT FOR YOU TO PAY FOR THE VERY BASICS LIKE FOOD, HOUSING, MEDICAL CARE, AND HEATING?: NOT HARD AT ALL

## 2022-08-16 ASSESSMENT — ENCOUNTER SYMPTOMS
EYE PAIN: 0
COUGH: 0
SHORTNESS OF BREATH: 0
RHINORRHEA: 0
SORE THROAT: 0

## 2022-09-18 NOTE — PROGRESS NOTES
2022    Shira Becerril (:  1970) is a 45 y.o. male, here for evaluation of the following medical concerns:    No chief complaint on file. Report visit    HPI  57-year-old white male with history of chronic cough, earwax, borderline vitamin D deficiency but otherwise quite fit on no prescription medication was seen a month ago for intensely pruritic arthropod bites starting 1 to 2 days after 3-day hike with the [employer], complicated by secondary infection treated with systemic and topical steroids, systemic antimicrobials with improvement. Review of Systems   Constitutional: Negative for activity change, appetite change, fatigue and unexpected weight change. HENT: Negative for dental problem, sinus pain, sore throat and trouble swallowing. Eyes: Negative for pain and visual disturbance. Respiratory: Negative for apnea, cough, chest tightness, shortness of breath and wheezing. Cardiovascular: Negative for chest pain and palpitations. Gastrointestinal: Negative for abdominal pain, blood in stool, constipation, diarrhea, nausea, rectal pain and vomiting. Endocrine: Negative for cold intolerance, heat intolerance, polydipsia, polyphagia and polyuria. Genitourinary: Negative for difficulty urinating, dysuria, flank pain, frequency, hematuria, pelvic pain, urgency. Musculoskeletal: Negative for arthralgias, back pain, gait problem, joint swelling, myalgias, neck pain and neck stiffness. Skin: Negative for color change and rash. Neurological: Negative for dizziness, tremors, syncope, speech difficulty, weakness, light-headedness and headaches. Hematological: Negative for adenopathy. Does not bruise/bleed easily. Psychiatric/Behavioral: Negative for agitation, behavioral problems, decreased concentration, sleep disturbance and suicidal ideas. The patient is not nervous/anxious and is not hyperactive. Prior to Visit Medications    Medication Sig Taking?  Authorizing Provider   predniSONE (DELTASONE) 10 MG tablet 6 tabs  x 3 days, 5 tabs x 3 days, 4 tabs  x 3 days; 3 tabs X 3 days; 2 tabs X 3 days; 1 tab X 3 days  CASSIE Su - CNP   pantoprazole (PROTONIX) 40 MG tablet Take 1 tablet by mouth every morning (before breakfast)  Patient not taking: Reported on 8/16/2022  Elizabeth Hilliard MD   Multiple Vitamins-Minerals (MULTIVITAMIN) tablet Take 1 tablet by mouth daily  Historical Provider, MD   fluticasone (FLONASE) 50 MCG/ACT nasal spray 1 spray by Each Nostril route daily  Patient not taking: Reported on 8/16/2022  Osvaldo Franco MD   fexofenadine-pseudoephedrine (ALLEGRA-D 24HR) 180-240 MG per extended release tablet Take 1 tablet by mouth daily  Patient not taking: Reported on 8/16/2022  Osvaldo Franco MD        No Known Allergies    Past Medical History:   Diagnosis Date    Allergic rhinitis     Asthma     as a child    Chronic cough 38    Melorheostosis        Past Surgical History:   Procedure Laterality Date    WISDOM TOOTH EXTRACTION         Social History     Socioeconomic History    Marital status:      Spouse name: Not on file    Number of children: Not on file    Years of education: Not on file    Highest education level: Not on file   Occupational History    Not on file   Tobacco Use    Smoking status: Never    Smokeless tobacco: Never   Vaping Use    Vaping Use: Never used   Substance and Sexual Activity    Alcohol use:  Yes     Alcohol/week: 10.0 standard drinks     Types: 10 Cans of beer per week    Drug use: Never    Sexual activity: Yes     Partners: Female   Other Topics Concern    Not on file   Social History Narrative    Not on file     Social Determinants of Health     Financial Resource Strain: Low Risk     Difficulty of Paying Living Expenses: Not hard at all   Food Insecurity: No Food Insecurity    Worried About [address] in the Last Year: Never true    [address] in the Last Year: Never true   Transportation Needs: Not on file   Physical Activity: Not on file   Stress: Not on file   Social Connections: Not on file   Intimate Partner Violence: Not on file   Housing Stability: Not on file        No family history on file. There were no vitals filed for this visit. Estimated body mass index is 22.64 kg/m² as calculated from the following:    Height as of 8/16/22: 6' 4\" (1.93 m). Weight as of 8/16/22: 186 lb (84.4 kg). PHYSICAL EXAM  GENERAL:  Pleasant  male who looks his stated age, awake alert and oriented x3, no acute distress. SKIN: Violaceous macules and papules on the distal lower extremities without evidence of active infection at this time. PSYCH:  No psychomotor retardation or agitation. Good eye contact. Unrestricted affect range. Mood congruent with affect. Linear thought. LABS  Lab Review   No visits with results within 14 Month(s) from this visit. Latest known visit with results is:   Orders Only on 12/17/2020   Component Date Value    Sodium 12/17/2020 141     Potassium 12/17/2020 4.7     Chloride 12/17/2020 102     CO2 12/17/2020 30     Anion Gap 12/17/2020 9     Glucose 12/17/2020 103 (A)    BUN 12/17/2020 22 (A)    Creatinine 12/17/2020 1.2     GFR Non- 12/17/2020 >60     GFR  12/17/2020 >60     Calcium 12/17/2020 10.1     Vit D, 25-Hydroxy 12/17/2020 28.7 (A)    ALT 12/17/2020 16     Cholesterol, Total 12/17/2020 163     Triglycerides 12/17/2020 84     HDL 12/17/2020 62 (A)    LDL Calculated 12/17/2020 84     VLDL Cholesterol Calcula* 12/17/2020 17     Glucose, Fasting 12/17/2020 103 (A)    TSH Reflex FT4 12/17/2020 2.27     Hep C Ab Interp 12/17/2020 Non-reactive          ASSESSMENT/PLAN  1. Colon cancer screening  Referred for colonoscopy last August patient chose not to pursue. Address follow-up visit. 2. Healthcare maintenance  Resume Shingrix. Tdap in 2017 at [facility]. He requires tetanus vaccination in order to support Boy  activities. He denies IV drug abuse, history of sex with men, sex with high risk partners and therefore is unlikely to benefit from HIV screen. Excellent lipid profile no indication for treatment. Barely impaired fasting glucose, will continue to watch. He increased his milk intake response to mildly depressed vitamin D levels. Will check A1c TSH PSA vitamin D lipid profile with next draw within the coming month. 3. Chronic cough  History exam supports perennial rhinitis, deviated septum, his cough is worse in the morning when he first wakes up. He is managing well with this. He found that coffee tends to trigger it. No lower respiratory tract symptoms. Saw ENT, he is pursuing conservative care. 4. Rhinorrhea  See #3 above. 5. Excessive ear wax, bilateral   Prescription for prophylactic Debrox sent. 6.  Arthropod bite, bilateral lower extremities, resolved. Observation only. Spoke about preventative measures such as boat shoes, DEET. No follow-ups on file. It was a pleasure to visit with Mr. Teri Ventura today. Answered all questions as best I could.     Elizabeth Iniguez MD   Time 14 minutes

## 2022-09-19 ENCOUNTER — OFFICE VISIT (OUTPATIENT)
Dept: PRIMARY CARE CLINIC | Age: 52
End: 2022-09-19
Payer: COMMERCIAL

## 2022-09-19 VITALS
DIASTOLIC BLOOD PRESSURE: 77 MMHG | TEMPERATURE: 97.4 F | SYSTOLIC BLOOD PRESSURE: 119 MMHG | WEIGHT: 183 LBS | HEART RATE: 69 BPM | OXYGEN SATURATION: 96 % | BODY MASS INDEX: 22.28 KG/M2

## 2022-09-19 DIAGNOSIS — E55.9 VITAMIN D DEFICIENCY: ICD-10-CM

## 2022-09-19 DIAGNOSIS — Z00.00 ROUTINE ADULT HEALTH MAINTENANCE: Primary | ICD-10-CM
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
## 2022-09-19 DIAGNOSIS — S90.569D: ICD-10-CM

## 2022-09-19 PROCEDURE — 99212 OFFICE O/P EST SF 10 MIN: CPT | Performed by: INTERNAL MEDICINE

## 2022-09-19 ASSESSMENT — PATIENT HEALTH QUESTIONNAIRE - PHQ9
SUM OF ALL RESPONSES TO PHQ QUESTIONS 1-9: 0
SUM OF ALL RESPONSES TO PHQ9 QUESTIONS 1 & 2: 0
SUM OF ALL RESPONSES TO PHQ QUESTIONS 1-9: 0
1. LITTLE INTEREST OR PLEASURE IN DOING THINGS: 0
SUM OF ALL RESPONSES TO PHQ QUESTIONS 1-9: 0
2. FEELING DOWN, DEPRESSED OR HOPELESS: 0
SUM OF ALL RESPONSES TO PHQ QUESTIONS 1-9: 0

## 2022-09-19 NOTE — PATIENT INSTRUCTIONS
Fasting blood work make sure they draw august and sept orders, will send letter  Will send letter  DEET